# Patient Record
Sex: FEMALE | Race: WHITE | NOT HISPANIC OR LATINO | URBAN - METROPOLITAN AREA
[De-identification: names, ages, dates, MRNs, and addresses within clinical notes are randomized per-mention and may not be internally consistent; named-entity substitution may affect disease eponyms.]

---

## 2021-11-01 ENCOUNTER — OFFICE VISIT (OUTPATIENT)
Dept: URBAN - METROPOLITAN AREA CLINIC 27 | Facility: CLINIC | Age: 66
End: 2021-11-01

## 2021-11-01 PROBLEM — 275978004 SCREENING FOR MALIGNANT NEOPLASM OF COLON: Status: ACTIVE | Noted: 2021-11-01

## 2021-11-10 ENCOUNTER — OFFICE VISIT (OUTPATIENT)
Dept: URBAN - METROPOLITAN AREA SURGERY CENTER 7 | Facility: SURGERY CENTER | Age: 66
End: 2021-11-10

## 2022-04-30 ENCOUNTER — TELEPHONE ENCOUNTER (OUTPATIENT)
Dept: URBAN - METROPOLITAN AREA CLINIC 121 | Facility: CLINIC | Age: 67
End: 2022-04-30

## 2022-04-30 RX ORDER — NAPROXEN SODIUM 220 MG
TABLET ORAL
OUTPATIENT
Start: 2008-07-17

## 2022-04-30 RX ORDER — CHOLECALCIFEROL (VITAMIN D3) 25 MCG
TABLET,CHEWABLE ORAL
OUTPATIENT
Start: 2013-03-26

## 2022-04-30 RX ORDER — POLYETHYLENE GLYCOL-3350 AND ELECTROLYTES 236; 6.74; 5.86; 2.97; 22.74 G/274.31G; G/274.31G; G/274.31G; G/274.31G; G/274.31G
TAKE BY MOUTH AS DIRECTED MIX AND USE AS DIRECTED. CAN BE SUBSTITUTED WITH GAVILYTE-C, GAVILYTE-N, TRILYTE, OR GENERIC IF NEEDED POWDER, FOR SOLUTION ORAL
OUTPATIENT
Start: 2021-11-02

## 2022-04-30 RX ORDER — POLYETHYLENE GLYCOL-3350 AND ELECTROLYTES 236; 6.74; 5.86; 2.97; 22.74 G/274.31G; G/274.31G; G/274.31G; G/274.31G; G/274.31G
TAKE BY MOUTH AS DIRECTED MIX AND USE AS DIRECTED. CAN BE SUBSTITUTED WITH GAVILYTE-C, GAVILYTE-N, TRILYTE, OR GENERIC IF NEEDED POWDER, FOR SOLUTION ORAL
OUTPATIENT
Start: 2021-11-02 | End: 2021-11-11

## 2022-04-30 RX ORDER — NAPROXEN SODIUM 220 MG
TABLET ORAL
OUTPATIENT
Start: 2008-07-17 | End: 2016-06-23

## 2022-04-30 RX ORDER — CHOLECALCIFEROL (VITAMIN D3) 25 MCG
TABLET,CHEWABLE ORAL
OUTPATIENT
Start: 2013-03-26 | End: 2016-06-23

## 2022-05-01 ENCOUNTER — TELEPHONE ENCOUNTER (OUTPATIENT)
Dept: URBAN - METROPOLITAN AREA CLINIC 121 | Facility: CLINIC | Age: 67
End: 2022-05-01

## 2022-05-01 RX ORDER — DENOSUMAB 60 MG/ML
INJECTION SUBCUTANEOUS
Status: ACTIVE | COMMUNITY
Start: 2016-06-23

## 2022-05-01 RX ORDER — ELECTROLYTES/DEXTROSE
SOLUTION, ORAL ORAL
Status: ACTIVE | COMMUNITY
Start: 2013-03-26

## 2022-05-01 RX ORDER — B-COMPLEX WITH VITAMIN C
TABLET ORAL
Status: ACTIVE | COMMUNITY
Start: 2013-03-26

## 2022-05-01 RX ORDER — UBIDECARENONE/VIT E ACET 100MG-5
CAPSULE ORAL
Status: ACTIVE | COMMUNITY
Start: 2013-03-26

## 2023-03-13 ENCOUNTER — LAB OUTSIDE AN ENCOUNTER (OUTPATIENT)
Dept: URBAN - METROPOLITAN AREA CLINIC 27 | Facility: CLINIC | Age: 68
End: 2023-03-13

## 2023-03-13 ENCOUNTER — CLAIMS CREATED FROM THE CLAIM WINDOW (OUTPATIENT)
Dept: URBAN - METROPOLITAN AREA CLINIC 27 | Facility: CLINIC | Age: 68
End: 2023-03-13

## 2023-03-13 ENCOUNTER — OFFICE VISIT (OUTPATIENT)
Dept: URBAN - METROPOLITAN AREA CLINIC 26 | Facility: CLINIC | Age: 68
End: 2023-03-13
Payer: MEDICARE

## 2023-03-13 ENCOUNTER — OFFICE VISIT (OUTPATIENT)
Dept: URBAN - METROPOLITAN AREA CLINIC 27 | Facility: CLINIC | Age: 68
End: 2023-03-13
Payer: MEDICARE

## 2023-03-13 ENCOUNTER — DASHBOARD ENCOUNTERS (OUTPATIENT)
Age: 68
End: 2023-03-13

## 2023-03-13 VITALS
DIASTOLIC BLOOD PRESSURE: 85 MMHG | BODY MASS INDEX: 22.19 KG/M2 | WEIGHT: 155 LBS | SYSTOLIC BLOOD PRESSURE: 112 MMHG | RESPIRATION RATE: 17 BRPM | HEIGHT: 70 IN | HEART RATE: 98 BPM

## 2023-03-13 DIAGNOSIS — R14.0 BLOATING: ICD-10-CM

## 2023-03-13 DIAGNOSIS — R68.81 EARLY SATIETY: ICD-10-CM

## 2023-03-13 DIAGNOSIS — R11.0 NAUSEA: ICD-10-CM

## 2023-03-13 DIAGNOSIS — K76.89 LIVER LESION: ICD-10-CM

## 2023-03-13 DIAGNOSIS — K21.9 GERD: ICD-10-CM

## 2023-03-13 DIAGNOSIS — F12.90 MARIJUANA USE: ICD-10-CM

## 2023-03-13 DIAGNOSIS — R19.8 IRREGULAR BOWEL HABITS: ICD-10-CM

## 2023-03-13 DIAGNOSIS — R10.13 LOWER ABDOMINAL PAIN: ICD-10-CM

## 2023-03-13 PROBLEM — 235595009 GASTROESOPHAGEAL REFLUX DISEASE: Status: ACTIVE | Noted: 2023-03-13

## 2023-03-13 PROBLEM — 300331000 LESION OF LIVER: Status: ACTIVE | Noted: 2023-03-13

## 2023-03-13 PROCEDURE — 83013 H PYLORI (C-13) BREATH: CPT | Performed by: INTERNAL MEDICINE

## 2023-03-13 PROCEDURE — 99204 OFFICE O/P NEW MOD 45 MIN: CPT | Performed by: INTERNAL MEDICINE

## 2023-03-13 PROCEDURE — 83014 H PYLORI DRUG ADMIN: CPT | Performed by: INTERNAL MEDICINE

## 2023-03-13 RX ORDER — ELECTROLYTES/DEXTROSE
SOLUTION, ORAL ORAL
Status: ACTIVE | COMMUNITY
Start: 2013-03-26

## 2023-03-13 RX ORDER — DENOSUMAB 60 MG/ML
INJECTION SUBCUTANEOUS
Status: ACTIVE | COMMUNITY
Start: 2016-06-23

## 2023-03-13 RX ORDER — B-COMPLEX WITH VITAMIN C
TABLET ORAL
Status: ACTIVE | COMMUNITY
Start: 2013-03-26

## 2023-03-13 RX ORDER — UBIDECARENONE/VIT E ACET 100MG-5
CAPSULE ORAL
Status: ACTIVE | COMMUNITY
Start: 2013-03-26

## 2023-03-13 NOTE — HPI-TODAY'S VISIT:
Patient here with multiple GI symptoms that present for at least the past few months.  She has intermittent bloating.  She denies any specific food sensitivities.  She uses a probiotic, though only PRN.  She does not use any anti-gas medication.  She has occasional nausea approximately 1 hour after a meal, and she thinks that this may be related to her "protein powder" that she uses in her smoothies.  She does have intermittent early satiety and states "I think I have decreased motility".  She does have intermittent bowel irregularity, both diarrhea and constipation.  The constipation appears to be more predominant.  She does not have any rectal bleeding.  She does have intermittent lower abdominal pain, more so in the left lower quadrant.  It is not nocturnal and she does not take any medication for this.  She thinks that it does improve when she has a bowel movement or if the bloating is better.  She has not lost any weight.  She has rare reflux symptoms for which she uses Rolaids approximately 2-3 times per year.  She sometimes has nocturnal symptoms.  She does smoke marijuana, and vapes at least a few times per week.  Recent CT abdomen pelvis revealed a small 2 cm exophytic lesion at the tip of the liver.  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  Her last colonoscopy was in 2021; no polyps.  A 1.2 cm adenoma was removed during a 2013 colonoscopy.  There is no family history of colon cancer but 2 sisters had polyps.

## 2023-03-14 LAB
A/G RATIO: 1.4
ABSOLUTE BASOPHILS: 105
ABSOLUTE EOSINOPHILS: 316
ABSOLUTE LYMPHOCYTES: 2714
ABSOLUTE MONOCYTES: 1205
ABSOLUTE NEUTROPHILS: 7359
ALBUMIN: 4.3
ALKALINE PHOSPHATASE: 56
ALT (SGPT): 11
AMYLASE: 49
AST (SGOT): 17
BASOPHILS: 0.9
BILIRUBIN, TOTAL: 0.8
BUN/CREATININE RATIO: (no result)
BUN: 11
CALCIUM: 10.1
CARBON DIOXIDE, TOTAL: 27
CHLORIDE: 104
CREATININE: 0.58
EGFR: 99
EOSINOPHILS: 2.7
GLOBULIN, TOTAL: 3
GLUCOSE: 105
HEMATOCRIT: 41.8
HEMOGLOBIN: 15
LIPASE: 37
LYMPHOCYTES: 23.2
MCH: 32.1
MCHC: 35.9
MCV: 89.5
MONOCYTES: 10.3
MPV: 13.2
NEUTROPHILS: 62.9
PLATELET COUNT: 422
POTASSIUM: 4.2
PROTEIN, TOTAL: 7.3
RDW: 12
RED BLOOD CELL COUNT: 4.67
SODIUM: 139
TSH: 3.94
WHITE BLOOD CELL COUNT: 11.7

## 2023-04-18 ENCOUNTER — WEB ENCOUNTER (OUTPATIENT)
Dept: URBAN - METROPOLITAN AREA CLINIC 27 | Facility: CLINIC | Age: 68
End: 2023-04-18

## 2023-04-24 ENCOUNTER — OFFICE VISIT (OUTPATIENT)
Dept: URBAN - METROPOLITAN AREA CLINIC 27 | Facility: CLINIC | Age: 68
End: 2023-04-24